# Patient Record
Sex: FEMALE | Race: WHITE | NOT HISPANIC OR LATINO | Employment: STUDENT | URBAN - METROPOLITAN AREA
[De-identification: names, ages, dates, MRNs, and addresses within clinical notes are randomized per-mention and may not be internally consistent; named-entity substitution may affect disease eponyms.]

---

## 2024-04-22 ENCOUNTER — CONSULT (OUTPATIENT)
Dept: GYNECOLOGY | Facility: CLINIC | Age: 19
End: 2024-04-22
Payer: COMMERCIAL

## 2024-04-22 VITALS — DIASTOLIC BLOOD PRESSURE: 58 MMHG | SYSTOLIC BLOOD PRESSURE: 90 MMHG

## 2024-04-22 DIAGNOSIS — N61.0 MASTITIS: ICD-10-CM

## 2024-04-22 DIAGNOSIS — N93.9 VAGINAL BLEEDING: Primary | ICD-10-CM

## 2024-04-22 PROCEDURE — 99203 OFFICE O/P NEW LOW 30 MIN: CPT | Performed by: OBSTETRICS & GYNECOLOGY

## 2024-04-22 RX ORDER — METRONIDAZOLE 500 MG/1
TABLET ORAL
COMMUNITY
Start: 2024-04-09

## 2024-04-22 RX ORDER — MOMETASONE FUROATE 50 UG/1
2 SPRAY, METERED NASAL DAILY
COMMUNITY

## 2024-04-22 RX ORDER — LEVOTHYROXINE SODIUM 0.07 MG/1
TABLET ORAL
COMMUNITY
Start: 2024-04-09

## 2024-04-22 RX ORDER — CEPHALEXIN 500 MG/1
500 CAPSULE ORAL EVERY 12 HOURS SCHEDULED
Qty: 14 CAPSULE | Refills: 0 | Status: SHIPPED | OUTPATIENT
Start: 2024-04-22 | End: 2024-04-29

## 2024-04-22 RX ORDER — EPINEPHRINE 0.3 MG/.3ML
0.3 INJECTION SUBCUTANEOUS
COMMUNITY
Start: 2024-04-03

## 2024-04-22 NOTE — PROGRESS NOTES
Assessment/Plan:    Explained that the reason for the bleeding is unknown and could be coincidental bleeding with Kyleena. She denies having a normal period since removal. Instructed to monitor menses. 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 She has been in contact with gyn at home since this incident and would like to have Kyleena replaced at home when she returns in 2 weeks. 100% condom use reviewed.   Will treat with Keflex for mastitis and recheck breast in 1 week. Pt advised to call immediately with any worsening or change in breast tissue. She verbalized understanding.     Diagnoses and all orders for this visit:    Vaginal bleeding    Mastitis  -     cephalexin (KEFLEX) 500 mg capsule; Take 1 capsule (500 mg total) by mouth every 12 (twelve) hours for 7 days    Other orders  -     sertraline (ZOLOFT) 50 mg tablet  -     metroNIDAZOLE (FLAGYL) 500 mg tablet; metroNIDAZOLE 500 MG Oral Tablet                      (6 sources)                   Nitroimidazole Antimicrobial Start: 04-  -     mometasone (NASONEX) 50 mcg/act nasal spray; 2 sprays into each nostril daily  -     levothyroxine 75 mcg tablet  -     EPINEPHrine (EPIPEN) 0.3 mg/0.3 mL SOAJ; Inject 0.3 mg into a muscle        Subjective:      Patient ID: Porsche Kothari is a 19 y.o. female.    New pt presents with a couple of concerns.   She was seen in [facility] ED on 4/5/24 and again on 4/7/24 after getting hit in the pelvic area with a soccer ball and having vaginal bleeding.  CT scan on 4/5/24 normal    TVU was normal on 4/7/24 with results as follows:    FINDINGS:     The uterus measures 7.2 x 3.1 x 4.8 cm.     The uterus is unremarkable in position  and unremarkable in echotexture. The   endometrial cavity echo measures 2 mm. IUD in appropriate position.     There is no free fluid.     The right adnexa measures 3.4 x 1.7 x 2.2 cm. There is arterial and venous blood   flow to the right adnexa.     The left adnexa measures 3.0  x 1.5 x 2.7 cm.  There is arterial and venous blood   flow to the left adnexa.     She had a Kyleena IUD inserted in 2020. It was removed in the ED on 4/7/24. The IUD was in proper position.   She was treated with oral flagyl and Cipro for gardnerella and ureaplasma. Pt denies vaginal discharge, odor, or painful urination.    Today, pt states she is no longer bleeding, denies pain. She denies vaginal discharge, odor, irritation or dysuria.   She has not been sexually active since onset of sx.   She did engage in exercise after waiting the week she was instructed to and had another episode of vaginal bleeding.     Unrelated to above, pt also reports new onset of left breast discharge that appeared brown in color resulting in crust around nipple. Denies pain, mass, skin change, trauma to the area.           The following portions of the patient's history were reviewed and updated as appropriate: allergies, current medications, past family history, past medical history, past social history, past surgical history and problem list.    Review of Systems   Constitutional: Negative.    HENT: Negative.     Respiratory: Negative.     Cardiovascular: Negative.    Gastrointestinal: Negative.    Endocrine: Negative.    Genitourinary:  Negative for difficulty urinating, dyspareunia, dysuria, frequency, menstrual problem, pelvic pain, urgency, vaginal discharge and vaginal pain.   Musculoskeletal: Negative.    Skin: Negative.    Neurological: Negative.    Psychiatric/Behavioral: Negative.           Objective:      BP 90/58   LMP 03/09/2024          Physical Exam  Vitals and nursing note reviewed. Exam conducted with a chaperone present.   Constitutional:       Appearance: Normal appearance.   HENT:      Head: Normocephalic and atraumatic.   Pulmonary:      Effort: Pulmonary effort is normal.   Chest:   Breasts:     Right: Inverted nipple (chronic) present. No swelling, bleeding, mass, nipple discharge, skin change or tenderness.       Left: Skin change (mild erythema surrounding areola) and tenderness present. No swelling, bleeding, inverted nipple, mass or nipple discharge (no d/c expressed today, dried residue noted around nipple).   Musculoskeletal:         General: Normal range of motion.      Cervical back: Normal range of motion.   Skin:     General: Skin is warm and dry.   Neurological:      Mental Status: She is alert and oriented to person, place, and time.   Psychiatric:         Mood and Affect: Mood normal.         Behavior: Behavior normal.         Thought Content: Thought content normal.         Judgment: Judgment normal.

## 2024-04-29 ENCOUNTER — OFFICE VISIT (OUTPATIENT)
Dept: GYNECOLOGY | Facility: CLINIC | Age: 19
End: 2024-04-29
Payer: COMMERCIAL

## 2024-04-29 VITALS — SYSTOLIC BLOOD PRESSURE: 100 MMHG | DIASTOLIC BLOOD PRESSURE: 60 MMHG

## 2024-04-29 DIAGNOSIS — N61.0 MASTITIS: Primary | ICD-10-CM

## 2024-04-29 PROCEDURE — 99212 OFFICE O/P EST SF 10 MIN: CPT | Performed by: OBSTETRICS & GYNECOLOGY

## 2024-04-29 NOTE — PROGRESS NOTES
Assessment/Plan:    Resolved mastitis. Pt advised to call with any further concerns.     Diagnoses and all orders for this visit:    Mastitis        Subjective:      Patient ID: Porsche Kothari is a 19 y.o. female.    Pt presents for breast check one week after treatment with Keflex for left breast mastitis.   She states sx are much improved. Denies any breast concerns today.         The following portions of the patient's history were reviewed and updated as appropriate: allergies, current medications, past family history, past medical history, past social history, past surgical history and problem list.    Review of Systems   Constitutional: Negative.    HENT: Negative.     Respiratory: Negative.     Cardiovascular: Negative.    Gastrointestinal: Negative.    Endocrine: Negative.    Genitourinary:  Negative for difficulty urinating, dyspareunia, dysuria, frequency, menstrual problem, pelvic pain, urgency, vaginal bleeding, vaginal discharge and vaginal pain.   Musculoskeletal: Negative.    Skin: Negative.    Neurological: Negative.    Psychiatric/Behavioral: Negative.           Objective:      /60   LMP 03/09/2024          Physical Exam  Vitals and nursing note reviewed.   Constitutional:       Appearance: Normal appearance.   HENT:      Head: Normocephalic and atraumatic.   Pulmonary:      Effort: Pulmonary effort is normal.   Chest:   Breasts:     Right: Inverted nipple (chronic) present. No swelling, bleeding, mass, nipple discharge, skin change or tenderness.      Left: Normal. No swelling, bleeding, inverted nipple, mass, nipple discharge (area of mastitis surrounding nipple resolved), skin change or tenderness.   Musculoskeletal:         General: Normal range of motion.      Cervical back: Normal range of motion.   Skin:     General: Skin is warm and dry.   Neurological:      Mental Status: She is alert and oriented to person, place, and time.   Psychiatric:         Mood and Affect: Mood normal.          Behavior: Behavior normal.         Thought Content: Thought content normal.         Judgment: Judgment normal.

## 2024-10-02 ENCOUNTER — TELEPHONE (OUTPATIENT)
Age: 19
End: 2024-10-02

## 2024-10-02 NOTE — TELEPHONE ENCOUNTER
Patients GI provider:   Unknown    Number to return call: ([phone]    Reason for call: Pt called to schedule an appt with Dr Bueno, but didn't want to wait til Dec. She was looking for an appt in [facility] within a week, but my first available is 11/12/24. She said she will call back.     Scheduled procedure/appointment date if applicable: Apt/procedure N/A

## 2024-12-10 ENCOUNTER — NURSE TRIAGE (OUTPATIENT)
Age: 19
End: 2024-12-10

## 2024-12-10 NOTE — TELEPHONE ENCOUNTER
"Incoming call from patient. Patient states that she has been having a heavier than normal menses. Bleeding started on 12/8 and since last night 12/9 patient has been saturating pad every 2 hours. Denies clots and feeling lightheaded. Patient is experiencing abdominal pain she rates 6/10. Requesting appointment in office for evaluation. Appointment scheduled for tomorrow, 12/11.     Bleeding precautions reviewed and patient verbalized understanding. If occurs, patient will be seen in ED.       Reason for Disposition   Periods with > 6 soaked pads or tampons per day    Answer Assessment - Initial Assessment Questions  1. BLEEDING SEVERITY: \"Describe the bleeding that you are having.\" \"How much bleeding is there?\"       Bleeding through pad every 2 hours. Has been this heavy since last night.   2. ONSET: \"When did the bleeding begin?\" \"Is it continuing now?\"      12/8  3. MENSTRUAL PERIOD: \"When was the last normal menstrual period?\" \"How is this different than your period?\"      Heavier than normal  4. REGULARITY: \"How regular are your periods?\"      regular  5. ABDOMEN PAIN: \"Do you have any pain?\" \"How bad is the pain?\"  (e.g., Scale 0-10; none, mild, moderate, or severe)      6/10   6. PREGNANCY: \"Is there any chance you are pregnant?\" \"When was your last menstrual period?\"      Iud taken out over past summer - possibility for pregnancy  7. BREASTFEEDING: \"Are you breastfeeding?\"      denies  8. HORMONE MEDICINES: \"Are you taking any hormone medicines, prescription or over-the-counter?\" (e.g., birth control pills, estrogen)      denies  9. BLOOD THINNER MEDICINES: \"Do you take any blood thinners?\" (e.g., Coumadin / warfarin, Pradaxa / dabigatran, aspirin)      denies  10. CAUSE: \"What do you think is causing the bleeding?\" (e.g., recent gyn surgery, recent gyn procedure; known bleeding disorder, cervical cancer, polycystic ovarian disease, fibroids)          denies  11. HEMODYNAMIC STATUS: \"Are you weak or feeling " "lightheaded?\" If Yes, ask: \"Can you stand and walk normally?\"         Denies feeling lightheaded  12. OTHER SYMPTOMS: \"What other symptoms are you having with the bleeding?\" (e.g., passed tissue, vaginal discharge, fever, menstrual-type cramps)        Denies    Protocols used: Vaginal Bleeding - Abnormal-Adult-OH    "

## 2024-12-11 ENCOUNTER — OFFICE VISIT (OUTPATIENT)
Dept: GYNECOLOGY | Facility: CLINIC | Age: 19
End: 2024-12-11
Payer: COMMERCIAL

## 2024-12-11 VITALS — DIASTOLIC BLOOD PRESSURE: 70 MMHG | SYSTOLIC BLOOD PRESSURE: 120 MMHG

## 2024-12-11 DIAGNOSIS — N93.9 ABNORMAL UTERINE BLEEDING (AUB): Primary | ICD-10-CM

## 2024-12-11 PROCEDURE — 99213 OFFICE O/P EST LOW 20 MIN: CPT | Performed by: OBSTETRICS & GYNECOLOGY

## 2024-12-11 RX ORDER — MEDROXYPROGESTERONE ACETATE 10 MG
10 TABLET ORAL DAILY
Qty: 10 TABLET | Refills: 0 | Status: SHIPPED | OUTPATIENT
Start: 2024-12-11 | End: 2024-12-21

## 2024-12-11 RX ORDER — OMALIZUMAB 150 MG/ML
INJECTION, SOLUTION SUBCUTANEOUS
COMMUNITY
Start: 2024-11-19

## 2024-12-11 RX ORDER — OMALIZUMAB 300 MG/2ML
INJECTION, SOLUTION SUBCUTANEOUS
COMMUNITY
Start: 2024-11-19

## 2024-12-11 NOTE — PROGRESS NOTES
Assessment     Healthy female exam.      Plan     {plan:89361}      Discussion:     Counseling     Counseled: {[facility] PATIENT/FAMILY/CAREGIVER:39983}    Counseling Topic: {COUSELING TOPIC (Optional):65810}    Medication side effects reviewed: {YES (DEF)/NO:90344}    Encounter timing: Total time of encounter *** minutes.     *** minutes was spent counseling.     Patient/Gaurdian understands and agrees with treatment plan: {YES (DEF)/NO:29416}       Patient counseled on risk of mother to child HIV transmission. (female patients only) {YES (DEF)/NO:74717}    Is there someone in patient's life who should be tested for HIV? {YES (DEF)/NO:49957}    Education:     General HIV education {YES (DEF)/NO:18114}    Adherence {YES (DEF)/NO:03580}    Prevention Care {YES (DEF)/NO:47518}    Subjective     Porsche Kothari is a 19 y.o. female here for a routine exam.  Current complaints: ***.  Personal health questionnaire reviewed: {yes/no:9010}.    HIV Adherence: {HIV ADHERENCE (Optional):35292}    {History Review:26982}    Gynecologic History  Patient's last menstrual period was 12/09/2024 (exact date).  Contraception: {method:5051}  Last Pap: ***. Results were: {norm/abn:86538}  Last mammogram: ***. Results were: {norm/abn:59120}    Obstetric History  OB History   No obstetric history on file.          {Common ambulatory SmartLinks:77410}    Review of Systems  {ros; complete:20235}      Objective     {exam; complete:21382}

## 2024-12-11 NOTE — PROGRESS NOTES
Assessment/Plan:     Abnormal uterine bleeding:  Will start Provera 10 mg for 10 days.  If she continues to have abnormal uterine bleeding she will return to the office for TVS possible SIS possible biopsy      Subjective:     Patient ID: Porsche Kothari is a 19 y.o. female.    HPI patient presents the office as a follow-up from an emergency room visit.  Patient had a medical termination done approximately 2 months ago.  She has had 2 menses since that procedure was performed.  Her most recent menses was very heavy with passage of large clots and cramping.  She was seen at the [facility] emergency department.  Exam was unremarkable.  Her hemoglobin was 13.5.  Since being seen in the emergency department the bleeding has significantly decreased.  She denies any fever.    She is not interested at this point on starting on contraception    Review of Systems      Objective:     Physical Exam